# Patient Record
Sex: MALE | Race: WHITE | NOT HISPANIC OR LATINO | Employment: OTHER | URBAN - METROPOLITAN AREA
[De-identification: names, ages, dates, MRNs, and addresses within clinical notes are randomized per-mention and may not be internally consistent; named-entity substitution may affect disease eponyms.]

---

## 2017-01-24 ENCOUNTER — HOSPITAL ENCOUNTER (OUTPATIENT)
Dept: RADIOLOGY | Facility: MEDICAL CENTER | Age: 72
Discharge: HOME | End: 2017-01-24
Payer: MEDICARE

## 2017-01-24 ENCOUNTER — TRANSCRIBE ORDERS (OUTPATIENT)
Dept: ADMINISTRATIVE | Facility: HOSPITAL | Age: 72
End: 2017-01-24

## 2017-01-24 DIAGNOSIS — R04.2 COUGHING UP BLOOD: Primary | ICD-10-CM

## 2017-01-24 DIAGNOSIS — R04.2 COUGHING UP BLOOD: ICD-10-CM

## 2017-01-24 PROCEDURE — 71250 CT THORAX DX C-: CPT

## 2017-07-26 ENCOUNTER — TRANSCRIBE ORDERS (OUTPATIENT)
Dept: ADMINISTRATIVE | Facility: HOSPITAL | Age: 72
End: 2017-07-26

## 2017-07-26 DIAGNOSIS — R79.89 ELEVATED D-DIMER: Primary | ICD-10-CM

## 2017-07-27 ENCOUNTER — HOSPITAL ENCOUNTER (OUTPATIENT)
Dept: RADIOLOGY | Facility: MEDICAL CENTER | Age: 72
Discharge: HOME | End: 2017-07-27
Payer: MEDICARE

## 2017-07-27 DIAGNOSIS — R79.89 ELEVATED D-DIMER: ICD-10-CM

## 2017-07-27 PROCEDURE — 71275 CT ANGIOGRAPHY CHEST: CPT

## 2017-07-27 RX ADMIN — IOHEXOL 85 ML: 350 INJECTION, SOLUTION INTRAVENOUS at 13:36

## 2017-09-21 ENCOUNTER — TRANSCRIBE ORDERS (OUTPATIENT)
Dept: ADMINISTRATIVE | Facility: HOSPITAL | Age: 72
End: 2017-09-21

## 2017-09-21 DIAGNOSIS — R61 GENERALIZED HYPERHIDROSIS: Primary | ICD-10-CM

## 2017-09-25 ENCOUNTER — HOSPITAL ENCOUNTER (OUTPATIENT)
Dept: RADIOLOGY | Facility: MEDICAL CENTER | Age: 72
Discharge: HOME | End: 2017-09-25
Payer: MEDICARE

## 2017-09-25 DIAGNOSIS — R61 GENERALIZED HYPERHIDROSIS: ICD-10-CM

## 2017-10-09 ENCOUNTER — HOSPITAL ENCOUNTER (OUTPATIENT)
Dept: RADIOLOGY | Facility: MEDICAL CENTER | Age: 72
Discharge: HOME | End: 2017-10-09
Payer: MEDICARE

## 2017-10-09 PROCEDURE — 74177 CT ABD & PELVIS W/CONTRAST: CPT

## 2017-10-09 RX ADMIN — IOHEXOL 100 ML: 350 INJECTION, SOLUTION INTRAVENOUS at 10:05

## 2022-09-01 ENCOUNTER — OFFICE VISIT (OUTPATIENT)
Dept: PODIATRY | Facility: CLINIC | Age: 77
End: 2022-09-01
Payer: MEDICARE

## 2022-09-01 VITALS — HEART RATE: 57 BPM | DIASTOLIC BLOOD PRESSURE: 80 MMHG | SYSTOLIC BLOOD PRESSURE: 156 MMHG | WEIGHT: 220 LBS

## 2022-09-01 DIAGNOSIS — L60.3 NAIL DYSTROPHY: Primary | ICD-10-CM

## 2022-09-01 PROCEDURE — 99202 OFFICE O/P NEW SF 15 MIN: CPT | Performed by: PODIATRIST

## 2022-09-01 RX ORDER — EZETIMIBE 10 MG/1
TABLET ORAL
COMMUNITY
Start: 2022-06-06

## 2022-09-01 RX ORDER — SENNOSIDES 8.6 MG
650 CAPSULE ORAL EVERY 8 HOURS PRN
COMMUNITY

## 2022-09-01 RX ORDER — APIXABAN 5 MG/1
TABLET, FILM COATED ORAL
COMMUNITY
Start: 2022-07-11

## 2022-09-01 RX ORDER — LATANOPROST 50 UG/ML
SOLUTION/ DROPS OPHTHALMIC
COMMUNITY
Start: 2022-07-16

## 2022-09-01 RX ORDER — ALBUTEROL SULFATE 90 UG/1
AEROSOL, METERED RESPIRATORY (INHALATION)
COMMUNITY
Start: 2022-06-07

## 2022-09-01 RX ORDER — DILTIAZEM HYDROCHLORIDE 120 MG/1
CAPSULE, COATED, EXTENDED RELEASE ORAL
COMMUNITY
Start: 2022-08-23

## 2022-09-01 RX ORDER — AMMONIUM LACTATE 12 G/100G
CREAM TOPICAL
COMMUNITY
Start: 2022-06-29

## 2022-09-01 RX ORDER — ZINC GLUCONATE 50 MG
50 TABLET ORAL DAILY
COMMUNITY

## 2022-09-01 RX ORDER — SOTALOL HYDROCHLORIDE 80 MG/1
TABLET ORAL
COMMUNITY
Start: 2022-06-01

## 2022-09-01 NOTE — PROGRESS NOTES
Assessment/Plan:    Explained to patient that he is dealing with a dystrophic toenail due to prior injury of nail matrix  No treatment needed due to paucity of symptoms  Reappoint p r n  Reassured him that he does not have an ingrown nail  No problem-specific Assessment & Plan notes found for this encounter  Diagnoses and all orders for this visit:    Nail dystrophy    Other orders  -     albuterol (PROVENTIL HFA,VENTOLIN HFA) 90 mcg/act inhaler; INHALE 1 PUFF EVERY 4 HOURS AS NEEDED FOR WHEEZING  (Patient not taking: Reported on 9/1/2022)  -     ammonium lactate (LAC-HYDRIN) 12 % cream  -     Eliquis 5 MG  -     Cholecalciferol 50 MCG (2000 UT) CAPS; Take 2 capsules by mouth every morning  -     diltiazem (CARDIZEM CD) 120 mg 24 hr capsule  -     ezetimibe (ZETIA) 10 mg tablet  -     latanoprost (XALATAN) 0 005 % ophthalmic solution; INSTILL 1 DROP INTO BOTH EYES ONCE A DAY INSTILL 1 DROP IN BOTH EYES DAILY  -     sotalol (BETAPACE) 80 mg tablet  -     zinc gluconate 50 mg tablet; Take 50 mg by mouth daily  -     acetaminophen (Tylenol 8 Hour Arthritis Pain) 650 mg CR tablet; Take 650 mg by mouth every 8 (eight) hours as needed for mild pain          Subjective:      Patient ID: Brittany Russell is a 78 y o  male  HPI     Patient, a 31-year-old male presents with concern regarding his right great toenail  This toenail is thick but not painful  Years back, partial matrixectomy was performed along the medial nail border but the total nail avulsion or matrixectomy was never performed  The nail has always been thickened  Patient simply wants to make sure that toenail thickness is okay  The following portions of the patient's history were reviewed and updated as appropriate: allergies, current medications, past family history, past medical history, past social history, past surgical history and problem list     Review of Systems   Skin: Negative  Hematological: Bruises/bleeds easily  Objective:      /80   Pulse 57   Wt 99 8 kg (220 lb)          Physical Exam  Constitutional:       Appearance: Normal appearance  Cardiovascular:      Pulses: Normal pulses  Musculoskeletal:         General: Normal range of motion  Skin:     Comments: Right great toenail is dystrophic  No evidence of onychomycosis  No sign of ingrown nails and no paronychia present

## 2024-04-22 ENCOUNTER — TELEPHONE (OUTPATIENT)
Dept: GASTROENTEROLOGY | Facility: CLINIC | Age: 79
End: 2024-04-22

## 2024-04-22 NOTE — TELEPHONE ENCOUNTER
Pt is due for a colonoscopy as per Dr Martino recall, but needs an OV with Dr Martino due to age, I called the patient but received no response. I left a message on the monitor

## 2024-06-20 ENCOUNTER — TELEPHONE (OUTPATIENT)
Dept: GASTROENTEROLOGY | Facility: CLINIC | Age: 79
End: 2024-06-20

## 2024-06-20 NOTE — TELEPHONE ENCOUNTER
Called pt to schedule office visit due to age for possible colonoscopy.  Left a message on his vm to return call.

## 2024-07-02 ENCOUNTER — TELEPHONE (OUTPATIENT)
Dept: GASTROENTEROLOGY | Facility: CLINIC | Age: 79
End: 2024-07-02

## 2024-07-02 NOTE — TELEPHONE ENCOUNTER
Pt ha a colon recall (former pt of Dr. Castellanos),  Due to his age he needs to have an office visit first.  I called pt but had to leave a message on his vm to return call.  Since this is the 3rd attempt to reach him, I will also send a reminder letter.    HEADACHE